# Patient Record
Sex: MALE
[De-identification: names, ages, dates, MRNs, and addresses within clinical notes are randomized per-mention and may not be internally consistent; named-entity substitution may affect disease eponyms.]

---

## 2024-11-14 ENCOUNTER — DASHBOARD ENCOUNTERS (OUTPATIENT)
Age: 39
End: 2024-11-14

## 2024-12-12 ENCOUNTER — OFFICE VISIT (OUTPATIENT)
Dept: URBAN - METROPOLITAN AREA CLINIC 94 | Facility: CLINIC | Age: 39
End: 2024-12-12
Payer: COMMERCIAL

## 2024-12-12 ENCOUNTER — LAB OUTSIDE AN ENCOUNTER (OUTPATIENT)
Dept: URBAN - METROPOLITAN AREA CLINIC 94 | Facility: CLINIC | Age: 39
End: 2024-12-12

## 2024-12-12 VITALS
HEART RATE: 79 BPM | DIASTOLIC BLOOD PRESSURE: 78 MMHG | TEMPERATURE: 98.2 F | SYSTOLIC BLOOD PRESSURE: 133 MMHG | WEIGHT: 315 LBS | BODY MASS INDEX: 42.66 KG/M2 | HEIGHT: 72 IN

## 2024-12-12 DIAGNOSIS — R10.13 EPIGASTRIC PAIN: ICD-10-CM

## 2024-12-12 DIAGNOSIS — R11.2 NAUSEA AND VOMITING, UNSPECIFIED VOMITING TYPE: ICD-10-CM

## 2024-12-12 PROCEDURE — 99204 OFFICE O/P NEW MOD 45 MIN: CPT | Performed by: INTERNAL MEDICINE

## 2024-12-12 RX ORDER — FAMOTIDINE 20 MG/1
TAKE 1 TABLET TABLET, FILM COATED ORAL ONCE A DAY
Status: ACTIVE | COMMUNITY

## 2024-12-12 RX ORDER — CIPROFLOXACIN HYDROCHLORIDE 500 MG/1
1 TABLET TABLET, FILM COATED ORAL
Qty: 14 TABLET | Status: ACTIVE | COMMUNITY

## 2024-12-12 RX ORDER — OMEPRAZOLE 40 MG/1
1 CAPSULE 30 MINUTES BEFORE MORNING MEAL CAPSULE, DELAYED RELEASE ORAL ONCE A DAY
Qty: 90 | Refills: 3 | OUTPATIENT
Start: 2024-12-12

## 2024-12-12 NOTE — HPI-TODAY'S VISIT:
39 yr male presents for evaluation of chronic abdominal pain Pt has chronic epigastric pain with nausea and vomiting dating back to 2016. Recently admitted 10/13-10/15/24 Formerly named [facility] with epigastric abdominal pain. Lipase was elevated at 1202, however US and CT did not suggest pancreatitis. Since then pt with multiple visits to ER with abdominal pain Multiple CT scans A/P FROM 10/14/24, 10/21/24 and 12/2/24 reviewed from ER. All CT scans showed no acute abnormality with normal looking pancreas.  RUQ US 10/14/24: WNL. LABS 12/4/24; CMP, LFTs, lipase, WBC, HGB: ALL WNL. - Pt states that he gets attacks of severe sharp epigastric pain with nausea and vomiting, on average 1 per month. Symptoms last a few days and then resolve - Currently he denies any GI symptoms - Pt has tried Pepcid and dicyclomine, without any improvement.

## 2025-01-09 ENCOUNTER — OFFICE VISIT (OUTPATIENT)
Dept: URBAN - METROPOLITAN AREA MEDICAL CENTER 34 | Facility: MEDICAL CENTER | Age: 40
End: 2025-01-09
Payer: COMMERCIAL

## 2025-01-09 DIAGNOSIS — K29.60 ADENOPAPILLOMATOSIS GASTRICA: ICD-10-CM

## 2025-01-09 DIAGNOSIS — K31.89 OTHER DISEASES OF STOMACH AND DUODENUM: ICD-10-CM

## 2025-01-09 DIAGNOSIS — K76.6 PORTAL HYPERTENSION: ICD-10-CM

## 2025-01-09 DIAGNOSIS — B96.81 BACTERIAL INFECTION DUE TO H. PYLORI: ICD-10-CM

## 2025-01-09 PROCEDURE — 43239 EGD BIOPSY SINGLE/MULTIPLE: CPT | Performed by: INTERNAL MEDICINE

## 2025-01-14 ENCOUNTER — TELEPHONE ENCOUNTER (OUTPATIENT)
Dept: URBAN - METROPOLITAN AREA CLINIC 94 | Facility: CLINIC | Age: 40
End: 2025-01-14

## 2025-01-14 PROBLEM — 708164002: Status: ACTIVE | Noted: 2025-01-14

## 2025-01-14 PROBLEM — 6185008: Status: ACTIVE | Noted: 2025-01-14

## 2025-01-14 RX ORDER — BISMUTH SUBCITRATE POTASSIUM, METRONIDAZOLE AND TETRACYCLINE HYDROCHLORIDE 140; 125; 125 MG/1; MG/1; MG/1
3 CAPSULES AFTER MEALS AND AT BEDTIME CAPSULE ORAL
Qty: 120 | Refills: 0 | OUTPATIENT
Start: 2025-01-14 | End: 2025-01-24

## 2025-02-28 ENCOUNTER — OFFICE VISIT (OUTPATIENT)
Dept: URBAN - METROPOLITAN AREA CLINIC 94 | Facility: CLINIC | Age: 40
End: 2025-02-28

## 2025-02-28 RX ORDER — OMEPRAZOLE 40 MG/1
1 CAPSULE 30 MINUTES BEFORE MORNING MEAL CAPSULE, DELAYED RELEASE ORAL ONCE A DAY
Qty: 90 | Refills: 3 | Status: ACTIVE | COMMUNITY
Start: 2024-12-12

## 2025-02-28 RX ORDER — FAMOTIDINE 20 MG/1
TAKE 1 TABLET TABLET, FILM COATED ORAL ONCE A DAY
Status: ACTIVE | COMMUNITY

## 2025-02-28 RX ORDER — CIPROFLOXACIN HYDROCHLORIDE 500 MG/1
1 TABLET TABLET, FILM COATED ORAL
Qty: 14 TABLET | Status: ACTIVE | COMMUNITY

## 2025-02-28 NOTE — HPI-TODAY'S VISIT:
Samm is a pleasant 39-year-old male who presents for follow up of his H Pylori gastritis. To recall, he was last seen by  in 12/2024 for chronic epigastric pain associated with n/v since 2016. He was admitted at [facility] from 10/13-10/15/24 with epigastric pain and lipase of 1202 however CT and RUQ US were normal. He has also had multiple ER visits for tthe same and has had multiple CTs which showed no acute abnormality with normal looking pancreas. He then underwent EGD as below which confirmed H pylori and he was treated with Qudruple therapy.  Currently,  Procedures: EGD 01/09/2025 - normal esophagus, mild PHG change seen, mild antral gastritis, normal duodenum (bp - moderate H Pylori gastritis) .